# Patient Record
Sex: MALE | Race: WHITE | Employment: OTHER | URBAN - METROPOLITAN AREA
[De-identification: names, ages, dates, MRNs, and addresses within clinical notes are randomized per-mention and may not be internally consistent; named-entity substitution may affect disease eponyms.]

---

## 2022-01-12 ENCOUNTER — HOSPITAL ENCOUNTER (OUTPATIENT)
Dept: PHYSICAL THERAPY | Age: 87
Discharge: HOME | End: 2022-01-12
Payer: MEDICARE

## 2022-01-12 PROCEDURE — 97162 PT EVAL MOD COMPLEX 30 MIN: CPT

## 2022-01-12 PROCEDURE — 97110 THERAPEUTIC EXERCISES: CPT

## 2022-01-12 NOTE — PROGRESS NOTES
PHYSICAL THERAPY - DAILY TREATMENT NOTE     Patient Name: Sandra Washington        Date: 2022  : 1926   YES Patient  Verified  Visit #:     Insurance: Payor: Catrachita Garcia / Plan: [place] MEDICARE PART A & B / Product Type: Medicare /      In time: 8:51 AM Out time: 9:51 AM   Total Treatment Time: 60 min. Medicare/[facility] Time Tracking (below)   Total Timed Codes (min):  15 1:1 Treatment Time:  11 min. TREATMENT AREA =  Other abnormalities of gait and mobility [R26.89]    SUBJECTIVE    Pain Level (on 0 to 10 scale):  0 / 10 (seated)   Medication Changes/New allergies or changes in medical history, any new surgeries or procedures? NO    If yes, update Summary List   Subjective Functional Status/Changes:  []  No changes reported     See POC         OBJECTIVE    15 min Therapeutic Exercise:  [x]  See flow sheet   Rationale:      increase ROM, increase strength, improve coordination and increase proprioception to improve the patients ability to transfer, stand, walk, perform ADLs, household chores, and recreation/fitness activities without falls and with no/less pain. Billed With/As:   [x] TE   [] TA   [] Neuro   [] Self Care Patient Education: [x] Review HEP    [x] Progressed/Changed HEP based on:   [x] positioning   [x] body mechanics   [x] transfers   [] heat/ice application    [x] other:  Patient instructed in and briefly performed beginning HEP for LE strengthening. Patient given handouts with pictures and written directions for HEP; copies placed in patient's chart.        Other Objective/Functional Measures:    See POC     Post Treatment Pain Level (on 0 to 10) scale:   0 / 10 (seated)     ASSESSMENT    Assessment/Changes in Function:     See POC     []  See Progress Note/Recertification   Patient will continue to benefit from skilled PT services to modify and progress therapeutic interventions, address functional mobility deficits, address ROM deficits, address strength deficits, analyze and address soft tissue restrictions, analyze and cue movement patterns, analyze and modify body mechanics/ergonomics, assess and modify postural abnormalities and instruct in home and community integration to attain remaining goals. Progress toward goals / Updated goals:    See POC       PLAN    [x]  Upgrade activities as tolerated YES Continue plan of care   []  Discharge due to :    []  Other:      Therapist: Tracey Giordano, PT    Date: 1/12/2022 Time: 8:52 AM   No future appointments.

## 2022-01-12 NOTE — PROGRESS NOTES
[address] [facility] PHYSICAL THERAPY AT [facility] [address] [place], [place] [facility] [address] - Phone: [phone]  Fax: [phone] / [address]  Patient Name: Maria Esther Robert : 1926   Medical   Diagnosis: Other abnormalities of gait and mobility [R26.89]; Falls Treatment Diagnosis: Same; Left hip and thigh pain; s/p left THR   Onset Date: \"Aug 2021\" Date of Surgery: 2021   Referral Source: Consuelo Wood MD Start of Care [facility]): 2022   Prior Hospitalization: See medical history Provider #: [id number]   Prior Level of Function: Lives with his adult daughter and has an adult son living in the [facility]; retired from working, drives, lawn and yard chores on riding mower for his [employer]; independent with ADLs and most household chores--daugher vacuums and son does his yard chores; home and community ambulation with [facility] after left THR until falling 2021. Comorbidities: Hypothyroidism; Depression; Arthritis; Hearing Impairment (hearing aids); Asbestosis; Right TKR (several years ago and doing well)   Medications: Verified on Patient Summary List   The Plan of Care and following information is based on the information from the initial evaluation.   ===================================================================  Assessment / key information:  Pt is a 81 yo right handed male who presents with signs and symptoms consistent with left hip and thigh pains after two falls and history of left THR in 2021. Pt reports location of pain left lateral hip and lateral thigh, nature of pain is intermittent, current pain level 0/10 (seated), pain level at worst 8/10, and pain level at best 0/10. Aggravating factors:  Standing and walking. Relieving factors:  Sitting and lying down.   PMHx/Falls:  As above, fell when going down a curb while using right SPC in 2021 and fell again in 2021 in his home going down last of 3 steps (taller than other 2)  into his den. Home/Occupation:  Lives with his adult daughter and has adult so nearby; Retired from working; Drives; [employer], but 3 steps up with railings from [employer] to [employer]; Lawn/Yard chores for his [employer] using riding mower. CLoF:  Independent ambulation with 4-wheeled RW--discomfort with first up and walking and it eases some as he continues to walk; independent self-care ADLs and does some household chores, but L hip/thigh pain in weightbearing (stand, walk); OK sleeping; OK driving (automatic transmission), but a little discomfort and difficulty getting into car for driving and uses hand to assist L LE; OK sitting, but difficulty and discomfort with arising from chairs; OK using L LE for pedal operation on riding mower. Today in PT, patient reports that he had home PT and outpatient PT (with HEP) following left THR (03/2021) and was doing very well and had progressed to walking well with a SPC (in right hand), but he fell when going down a curb at his [employer] in 08/2021, needing help from 2 men to get back to standing, and went to ER due to sustaining L orbital laceration and cracking a few lower left ribs; he denies left LE injury in the fall and xrays were negative. Since then, patient returned to walking with RW and had additional home health PT. Since his fall, patient has been experiencing pain at his left lateral hip and thigh area with arising from sitting, standing, and walking, but no pain in non-weightbearing positions. Pt reports some tenderness present at left posterior gluteal area. Pt was his PCP on 01/10/2022 for routine examination/follow-up and discussed his falls and symptoms and he was given Rx for PT. Pt reports that he has resumed some of his PT home exercises and they are OK; he will bring list/pictures in at next visit. Pt takes 2 tablets of OTC pain medication in the mornings and again at night and it does help some.   Pt is not using heat or ice for pain. OBJECTIVE:      Posture/Observation:  Sits and stands with elevated bilateral shoulders; sitting and standing trunk posture is WFL. Functional Mobility:  Sit ==> Stand with need for hands to push up on chair armrests and minimal L hip/thigh discomfort; Stand ==> Sit with reasonable control but keeping hands on RW handles; patient initially leaves RW to one side and walks 2-3 steps without AD use and moves to sitting with use of hands on chair armrests. Patient is able to bend over by flexing at hips > spine and knees to reach one hand to retrieve small item from floor and other hand on RW handle (unlocked) for support. Independent without pain but with some increased effort for sit <==> supine (to left) and rolling supine <==> right sidelying. Not Tested/To Be Assessed = SLS and balance in SLS, Heel Raises (ankle PF in SLS), Heel Walking, and Squating. Gait:  Pt ambulates independently/SBAssist with 4-wheeled RW for ~200 to 250' at good terry and correct usage and no deviations and minimal c/o left lateral hip/thigh discomfort; ambulation with SPC in right hand with CG/SBAssist for ~40' with correct form/sequencing and slightly slower terry, touching furniture with L hand when turning around, no deviations and minimal c/o L LE discomfort. LE PROM and Flexibility (supine):  Hip Flex (supine KTC) = to ~110 deg L and some end range anterior hip discomfort, WFL and okay R; Hip ABd/Hip Adductors (supine) = mildly decreased/tight R and mild to moderately decreased/tight L, both with some muscle stretch discomfort; Hip ER (@90 deg hip flex supine) = ~70 to 75 deg left and OK, NT/TBA left; Hip IR (@90 deg hip flex supine) = ~5 to 10 deg left with thigh discomfort, NT/TBA left;  Hip Extension = Not Tested/TBA, but to neutral and okay bilat in supine; Knee Extension (supine) = full and OK bilat; Knee Flex (@90 deg hip flex supine) = WFL and okay left, NT/TBA left; Hamstrings (knee ext PROM @90 deg hip flex supine) = about -45 deg left and about -30 to -35 deg right, okay bilat; Piriformis (diagonal KTC supine) = WFL and okay right, mildly decreased motion and some hip discomfort left; Gastroc/Ankle DF (supine with knee extended) = WFL bilat, but muscle stretch discomfort L > R; Not Tested/To Be Assessed = Iliopsoas, ITB, Quadriceps. LE MMT (isometric hold in mid range): Hip Flex (reclined sitting) = 5-/5 R and 4 to 4+/5 L, okay bilat; Hip ER (seated) = 5/5 R and 3- to 3/5 L and okay bilat; Hip IR (seated) = 5/5 R and okay, 5-/5 L and some thigh discomfort; Hip ABd (SLR in contralateral sidelying) = </= 2 to 2+/5 right and OK, and Not Tested/TBA left; Hip Adduction (supine) = 5/5 R and 4+ to 5-/5 L and okay bilat; Hip Ext (@90 deg hip flex supine) = 5-/5 R and 4+ to 5-/5 L and okay bilat; Knee Flex (seated) = 5/5 R and 5- to 5/5 L and okay bilat; Knee Ext (seated) = 5-/5 R and 4+ to 5-/5 L and okay bilat; Ankle Motions = Not Tested/To Be Assessed. Palpation:  tender left later thigh and left posterior gluteal areas. Special Tests:  Harkins Balance Scale; Timed Get Up and Go; and Functional Gait Assessment = Not Tested/To Be Assessed. Pt would benefit from skilled PT intervention in order to improve upon previously mentioned subjective/objective impairments. ===========================================================================================  Eval Complexity:  History:  HIGH Complexity :3+ comorbidities / personal factors will impact the outcome/ POC ;  Examination:  MEDIUM Complexity : 3 Standardized tests and measures addressing body structure, function, activity limitation and / or participation in recreation ; Presentation:  MEDIUM Complexity : Evolving with changing characteristics ; Decision Making:  MEDIUM Complexity : FOTO score of 26-74; Overall Complexity:  MEDIUM.   Problem List:  pain affecting function, decrease ROM, decrease strength, impaired gait/ balance, decrease ADL/ functional abilitiies, decrease activity tolerance, decrease flexibility/ joint mobility and decrease transfer abilities. Treatment Plan may include any combination of the following:  Therapeutic exercise, Therapeutic activities, Neuromuscular re-education, Physical agent/modality, Gait/balance training, Manual therapy, Patient education, Self Care training, Functional mobility training, Home safety training and Stair training. Patient / Family readiness to learn indicated by:  asking questions, trying to perform skills and interest.  Persons(s) to be included in education:  Patient (P) and Family Support Person (FSP)--Daughter. Barriers to Learning/Limitations:  yes;  sensory deficits-vision/hearing/speech  Measures taken, if barriers to learning Hearing impairment--wears hearing aids, but left is not functioning and will be repaired--speak from patient's right side and/or face to face with eye contact and from closer distance and/or with increased volume. Patient Goal (s): \"gain stability and balane\"     Patient self reported health status:  Good. Rehabilitation Potential:  Good.  Short Term Goals: To be accomplished in  4  weeks:    1. Establish initial HEP and pt compliant with instructions. 2. Decrease max pain scale rating by >/= 2-3 points. 3. Consistent correct hand placement for sit <==> stand transfers; locking RW brakes when needed. 4. Increase L LE MMT scores by >/= 1/3 grade or to >/= 5-/5 throughout. 5. Increase left HS flexibility to equal to right.  Long Term Goals: To be accomplished in  8  weeks:    1. Increase FOTO score to >/= 62/100 indicating improved function. 2. Decrease max pain scale rating by >/= 5-6 points. 3. No tenderness to palpation. 4. Increase L LE MMT scores by >/= 2/3 grade or to >/= 5- to 5/5 throughout and no/negligible discomfort for resistive testing.   5. Patient safe for home and community ambulation with right SPC, including stairs and curbs and inclines. Frequency / Duration:  Patient to be seen  2-3  times per week for 6-8 weeks. Patient / Caregiver education and instruction:  self care, activity modification, and exercises. Therapist Signature: Joseluis Santillan PT Date: 8/82/8779   Certification Period: 01/12/2022 to 04/12/2022 Time: 8:51 AM   ===========================================================================================    To ensure your patient receives the highest quality care and to avoid disruption in therapy please sign and return this plan of care within 21 days. Per Medicaid guidelines if the plan of care is not received within 21 days the patient's care must be put on hold until signed. I certify that the above Physical Therapy Services are being furnished while the patient is under my care. I agree with the treatment plan and certify that this therapy is necessary. Physician Signature:      Date:       Time:     Kate Means MD      Please sign and return to [facility] at [address] or you may fax the signed copy to [phone]. Thank you.

## 2022-01-14 ENCOUNTER — HOSPITAL ENCOUNTER (OUTPATIENT)
Dept: PHYSICAL THERAPY | Age: 87
Discharge: HOME | End: 2022-01-14
Payer: MEDICARE

## 2022-01-14 PROCEDURE — 97112 NEUROMUSCULAR REEDUCATION: CPT

## 2022-01-14 PROCEDURE — 97110 THERAPEUTIC EXERCISES: CPT

## 2022-01-14 PROCEDURE — 97530 THERAPEUTIC ACTIVITIES: CPT

## 2022-01-14 NOTE — PROGRESS NOTES
PHYSICAL THERAPY - DAILY TREATMENT NOTE     Patient Name: Norma Blanco        Date: 2022  : 1926   YES Patient  Verified  Visit #:      12  Insurance: Payor: VA MEDICARE / Plan: VA MEDICARE PART A & B / Product Type: Medicare /      In time: 8:10 AM Out time: 9:13 AM   Total Treatment Time: 63 min. Medicare/[employer] Time Tracking (below)   Total Timed Codes (min):  63 min. 1:1 Treatment Time:  63 min. TREATMENT AREA =  Other abnormalities of gait and mobility [R26.89]    SUBJECTIVE    Pain Level (on 0 to 10 scale):  0 / 10   Medication Changes/New allergies or changes in medical history, any new surgeries or procedures? NO    If yes, update Summary List   Subjective Functional Status/Changes:  []  No changes reported     No c/o problems with or questions about new HEP handouts from first PT session. Patient brings in post-op THR booklet (with exercises) given to him in the hospital and some HEP handouts given to him by previous PT and reviewed by therapist--standard hip and knee exercises on bed, sitting, and standing, including sit <==> stand from/to chair. OBJECTIVE    43 min Therapeutic Exercise:  [x]  See flow sheet   Rationale:      increase ROM, increase strength, improve coordination, improve balance and increase proprioception to improve the patients ability to transfer, stand, walk, perform ADLs, household chores, and recreation/fitness activities without falls and with no/less pain. 10 min Therapeutic Activity: [x]  See flow sheet   Rationale:      increase ROM, increase strength, improve coordination, improve balance and increase proprioception to improve the patients ability to transfer, stand, walk, perform ADLs, household chores, and recreation/fitness activities without falls and with no/less pain.       10 min Neuromuscular Re-ed: [x]  See flow sheet   Rationale:      increase strength, improve coordination, improve balance and increase proprioception to improve the patients ability to transfer, stand, walk, perform ADLs, household chores, and recreation/fitness activities without falls and with no/less pain. Billed With/As:   [x] TE   [x] TA   [x] Neuro   [x] Self Care Patient Education: [x] Review HEP    [x] Progressed/Changed HEP based on:   [x] positioning   [x] body mechanics   [] transfers   [] heat/ice application    [x] other:  Added to HEP--standing weightshift forward/back with feet in \"step stance\" (L/R and L\R) with min UE support; will give handout at next PT session. Other Objective/Functional Measures:    Pt ambulated about 60' x 2 with right SPC and CG/SBAssist with good technique and stability. Post Treatment Pain Level (on 0 to 10) scale:   0 / 10     ASSESSMENT    Assessment/Changes in Function:     Continued very weak left hip aBductors and external rotators; did a bit better with eccentric L hip aBd SLRs in R sidelying compared to concentric attempts. []  See Progress Note/Recertification   Patient will continue to benefit from skilled PT services to modify and progress therapeutic interventions, address functional mobility deficits, address ROM deficits, address strength deficits, analyze and address soft tissue restrictions, analyze and cue movement patterns, analyze and modify body mechanics/ergonomics, assess and modify postural abnormalities and instruct in home and community integration to attain remaining goals. Progress toward goals / Updated goals:    Good Progress to    [x] STG    [] LTG  1, 2, 4 as shown by patient report and observed performance of activities during the PT session.        PLAN    [x]  Upgrade activities as tolerated YES Continue plan of care   []  Discharge due to :    []  Other:      Therapist: Kailee Parry, PT    Date: 1/14/2022 Time: 7:12 AM     Future Appointments   Date Time Provider Jeremias Joyce   1/14/2022  8:15 AM Keyon Kinsey PT [facility]   1/17/2022  9:00 AM Marcelina Cornel Canavan, PT [facility]   1/19/2022  9:00 AM Mickiel Both, PT [facility]   1/24/2022  9:00 AM Mickiel Both, PT [facility]   1/26/2022  9:00 AM Mickiel Both, PT [facility]   1/31/2022  8:15 AM [facility]   2/2/2022  8:15 AM Thedford Kingfisher, PTA [facility]   2/7/2022  9:00 AM [facility]   2/9/2022  9:00 AM [facility]   2/14/2022  9:00 AM Mickiel Both, PT [facility]   2/16/2022  9:00 AM Mickiel Both, PT [facility]   2/19/2022  8:15 AM Mickiel Both, PT [facility]

## 2022-01-17 ENCOUNTER — HOSPITAL ENCOUNTER (OUTPATIENT)
Dept: PHYSICAL THERAPY | Age: 87
Discharge: HOME | End: 2022-01-17
Payer: MEDICARE

## 2022-01-17 PROCEDURE — 97530 THERAPEUTIC ACTIVITIES: CPT

## 2022-01-17 PROCEDURE — 97112 NEUROMUSCULAR REEDUCATION: CPT

## 2022-01-17 PROCEDURE — 97110 THERAPEUTIC EXERCISES: CPT

## 2022-01-17 NOTE — PROGRESS NOTES
PHYSICAL THERAPY - DAILY TREATMENT NOTE      Patient Name: Autumn Dubois                           Date: 2022  : 1926                                  YES Patient  Verified  Visit #:   3   of   12                  Insurance: Payor: Jarrett Memory / Plan: VA MEDICARE PART A & B / Product Type: Medicare /       In time: 9:00 AM Out time: 10:00 AM   Total Treatment Time: 60 min.          Medicare/[facility] Time Tracking (below)   Total Timed Codes (min):  60 min. 1:1 Treatment Time:  54 min.      TREATMENT AREA =  Other abnormalities of gait and mobility [R26.89]     SUBJECTIVE     Pain Level (on 0 to 10 scale):  0 / 10   Medication Changes/New allergies or changes in medical history, any new surgeries or procedures? NO    If yes, update Summary List   Subjective Functional Status/Changes:  []? No changes reported      No c/o pain currently and he felt OK after last PT session. No problems over this past weekend.         OBJECTIVE     40 min Therapeutic Exercise:  [x]? See flow sheet   Rationale:      increase ROM, increase strength, improve coordination, improve balance and increase proprioception to improve the patients ability to transfer, stand, walk, perform ADLs, household chores, and recreation/fitness activities without falls and with no/less pain.       10 min Therapeutic Activity: [x]? See flow sheet   Rationale:      increase ROM, increase strength, improve coordination, improve balance and increase proprioception to improve the patients ability to transfer, stand, walk, perform ADLs, household chores, and recreation/fitness activities without falls and with no/less pain.       10 min Neuromuscular Re-ed: [x]?   See flow sheet   Rationale:      increase strength, improve coordination, improve balance and increase proprioception to improve the patients ability to transfer, stand, walk, perform ADLs, household chores, and recreation/fitness activities without falls and with no/less pain.     Billed With/As:   [x]? TE   [x]? TA   [x]? Neuro   [x]? Self Care Patient Education: [x]? Review HEP    [x]? Progressed/Changed HEP based on:   []? positioning   []? body mechanics   []? transfers   []? heat/ice application    []? other:               Other Objective/Functional Measures:     Pt ambulated about 61' and 90' x 1 each with right SPC and CG/SBAssist with good technique and stability--a little unsteady with first step or two after having been supine doing LE strengthening exercises for ~40 minutes prior.         Post Treatment Pain Level (on 0 to 10) scale:   0 / 10      ASSESSMENT     Assessment/Changes in Function:      Continued very weak left hip aBductors and external rotators; did a bit better with eccentric L hip aBd SLRs in R sidelying compared to concentric attempts and with later reps of manuallly resisted eccentric aBd heel slides.      []? See Progress Note/Recertification   Patient will continue to benefit from skilled PT services to modify and progress therapeutic interventions, address functional mobility deficits, address ROM deficits, address strength deficits, analyze and address soft tissue restrictions, analyze and cue movement patterns, analyze and modify body mechanics/ergonomics, assess and modify postural abnormalities and instruct in home and community integration to attain remaining goals. Progress toward goals / Updated goals:     Good Progress to    [x]? STG    []? LTG  1, 2, 4 as shown by patient report and observed performance of activities during the PT session.         PLAN           [x]? Upgrade activities as tolerated YES Continue plan of care   []? Discharge due to :     []?   Other:              Therapist: SANDY Hart     Date: 01/17/2022 Time: 7:28 AM

## 2022-01-19 ENCOUNTER — HOSPITAL ENCOUNTER (OUTPATIENT)
Dept: PHYSICAL THERAPY | Age: 87
Discharge: HOME | End: 2022-01-19
Payer: MEDICARE

## 2022-01-19 PROCEDURE — 97530 THERAPEUTIC ACTIVITIES: CPT

## 2022-01-19 PROCEDURE — 97112 NEUROMUSCULAR REEDUCATION: CPT

## 2022-01-19 PROCEDURE — 97110 THERAPEUTIC EXERCISES: CPT

## 2022-01-19 NOTE — PROGRESS NOTES
PHYSICAL THERAPY - DAILY TREATMENT NOTE      Patient Name: Herberth Nj                           Date: 2022  : 1926                                  YES Patient  Verified  Visit #:                     Insurance: Payor: Luis Eduardo Ricci / Plan: [place] MEDICARE PART A & B / Product Type: Medicare /       In time: 9:00 AM Out time: 10:01 AM   Total Treatment Time: 60 min.            Medicare/[facility] Time Tracking (below)   Total Timed Codes (min):  60 min. 1:1 Treatment Time:  60 min.      TREATMENT AREA =  Other abnormalities of gait and mobility [R26.89]     SUBJECTIVE     Pain Level (on 0 to 10 scale):  0 / 10   Medication Changes/New allergies or changes in medical history, any new surgeries or procedures?    NO    If yes, update Summary List   Subjective Functional Status/Changes:  []??  No changes reported      No c/o pain currently, but felt \"stiff\" after his last PT session and the next day--he applied ice.       OBJECTIVE     31 min Therapeutic Exercise:  [x]? ?  See flow sheet   Rationale:      increase ROM, increase strength, improve coordination, improve balance and increase proprioception to improve the patients ability to transfer, stand, walk, perform ADLs, household chores, and recreation/fitness activities without falls and with no/less pain.       15 min Therapeutic Activity: [x]? ?  See flow sheet   Rationale:      increase ROM, increase strength, improve coordination, improve balance and increase proprioception to improve the patients ability to transfer, stand, walk, perform ADLs, household chores, and recreation/fitness activities without falls and with no/less pain.       15 min Neuromuscular Re-ed: [x]? ?  See flow sheet   Rationale:      increase strength, improve coordination, improve balance and increase proprioception to improve the patients ability to transfer, stand, walk, perform ADLs, household chores, and recreation/fitness activities without falls and with no/less pain.       Billed With/As:   []?? TE   []?? TA   [x]? ? Neuro   []? ? Self Care Patient Education: [x]? ? Review HEP    []? ? Progressed/Changed HEP based on:   []?? positioning   []? ? body mechanics   []? ? transfers   []? ? heat/ice application    []? ? other:                 Other Objective/Functional Measures:     Pt tolerated more standing activities well today.         Post Treatment Pain Level (on 0 to 10) scale:   0 / 10      ASSESSMENT     Assessment/Changes in Function:      Pt doing better with eccentric L hip aBd SLRs in R sidelying and seated T-band resisted hip aBd/ER.      []? ?  See Progress Note/Recertification   Patient will continue to benefit from skilled PT services to modify and progress therapeutic interventions, address functional mobility deficits, address ROM deficits, address strength deficits, analyze and address soft tissue restrictions, analyze and cue movement patterns, analyze and modify body mechanics/ergonomics, assess and modify postural abnormalities and instruct in home and community integration to attain remaining goals.      Progress toward goals / Updated goals:     Good Progress to    [x]? ? STG    []? ? LTG  1, 2, 4 as shown by patient report and observed performance of activities during the PT session.         PLAN               [x]? ?  Upgrade activities as tolerated YES Continue plan of care   []? ?  Discharge due to :     []? ?  Other:                  Therapist: SANDY Barnard     Date: 01/19/2022 Time: 7:12 AM

## 2022-01-24 ENCOUNTER — HOSPITAL ENCOUNTER (OUTPATIENT)
Dept: PHYSICAL THERAPY | Age: 87
Discharge: HOME | End: 2022-01-24
Payer: MEDICARE

## 2022-01-24 PROCEDURE — 97110 THERAPEUTIC EXERCISES: CPT

## 2022-01-24 PROCEDURE — 97112 NEUROMUSCULAR REEDUCATION: CPT

## 2022-01-24 PROCEDURE — 97530 THERAPEUTIC ACTIVITIES: CPT

## 2022-01-24 NOTE — PROGRESS NOTES
PHYSICAL THERAPY - DAILY TREATMENT NOTE      Patient Name: Herberth Nj                           Date: 2022  : 1926                                  YES Patient  Verified  Visit #:                     Insurance: Payor: Nisa Ireland / Plan: [place] MEDICARE PART A & B / Product Type: Medicare /       In time: 8:49 AM Out time: 9:50 AM   Total Treatment Time: 61 min.            Medicare/[facility] Time Tracking (below)   Total Timed Codes (min):  61 min. 1:1 Treatment Time:  56 min.      TREATMENT AREA =  Other abnormalities of gait and mobility [R26.89]     SUBJECTIVE     Pain Level (on 0 to 10 scale):  0 / 10   Medication Changes/New allergies or changes in medical history, any new surgeries or procedures?    NO    If yes, update Summary List   Subjective Functional Status/Changes:  []???  No changes reported      No c/o pain currently, but felt \"stiff\" after his last PT session and the next day--he applied ice.       OBJECTIVE     31 min Therapeutic Exercise:  [x]? ??  See flow sheet   Rationale:      increase ROM, increase strength, improve coordination, improve balance and increase proprioception to improve the patients ability to transfer, stand, walk, perform ADLs, household chores, and recreation/fitness activities without falls and with no/less pain.       15 min Therapeutic Activity: [x]???  See flow sheet   Rationale:      increase ROM, increase strength, improve coordination, improve balance and increase proprioception to improve the patients ability to transfer, stand, walk, perform ADLs, household chores, and recreation/fitness activities without falls and with no/less pain.       15 min Neuromuscular Re-ed: [x]???  See flow sheet   Rationale:      increase strength, improve coordination, improve balance and increase proprioception to improve the patients ability to transfer, stand, walk, perform ADLs, household chores, and recreation/fitness activities without falls and with no/less pain.       Billed With/As:   []??? TE   []??? TA   []??? Neuro   []??? Self Care Patient Education: [x]??? Review HEP    []? ?? Progressed/Changed HEP based on:   []??? positioning   []? ?? body mechanics   []? ?? transfers   []? ?? heat/ice application    []? ?? other:                 Other Objective/Functional Measures:     Pt did very well with standing weightshifts and with walking without UE support in parallel bars (mirror for visual feedback) and focusing on step symmetry and avoiding scissoring.       Post Treatment Pain Level (on 0 to 10) scale:   0 / 10      ASSESSMENT     Assessment/Changes in Function:      Pt doing better with manually resisted eccentric L hip aBd slides in supine and with assisted conc/independent ecc R hip aBd SLRs in L sidelying.      []? ??  See Progress Note/Recertification   Patient will continue to benefit from skilled PT services to modify and progress therapeutic interventions, address functional mobility deficits, address ROM deficits, address strength deficits, analyze and address soft tissue restrictions, analyze and cue movement patterns, analyze and modify body mechanics/ergonomics, assess and modify postural abnormalities and instruct in home and community integration to attain remaining goals.      Progress toward goals / Updated goals:     Good Progress to    [x]? ?? STG    []? ?? LTG  1, 2, 4 as shown by patient report and observed performance of activities during the PT session.         PLAN               [x]? ??  Upgrade activities as tolerated YES Continue plan of care   []? ??  Discharge due to :     []???  Other:                  Therapist: Slick Cowan     Date: 01/24/2022 Time: 7:15 AM

## 2022-01-26 ENCOUNTER — HOSPITAL ENCOUNTER (OUTPATIENT)
Dept: PHYSICAL THERAPY | Age: 87
Discharge: HOME | End: 2022-01-26
Payer: MEDICARE

## 2022-01-26 PROCEDURE — 97110 THERAPEUTIC EXERCISES: CPT

## 2022-01-26 PROCEDURE — 97530 THERAPEUTIC ACTIVITIES: CPT

## 2022-01-26 PROCEDURE — 97112 NEUROMUSCULAR REEDUCATION: CPT

## 2022-01-26 NOTE — PROGRESS NOTES
PHYSICAL THERAPY - DAILY TREATMENT NOTE      Patient Name: Herberth Nj                           Date:   : 1926                                  YES Patient  Verified  Visit #:                     Insurance: Payor: Naldo Khalil / Plan: [place] MEDICARE PART A & B / Product Type: Medicare /       In time: 8:53 AM Out time: 9:52 AM   Total Treatment Time: 59 min.            Medicare/[facility] Time Tracking (below)   Total Timed Codes (min):  59 min. 1:1 Treatment Time:  58 min.      TREATMENT AREA =  Other abnormalities of gait and mobility [R26.89]     SUBJECTIVE     Pain Level (on 0 to 10 scale):  0 / 10   Medication Changes/New allergies or changes in medical history, any new surgeries or procedures?    NO    If yes, update Summary List   Subjective Functional Status/Changes:  []????  No changes reported      No c/o pain currently, but felt \"stiff\" after his last PT session and the next day--he applied ice.       OBJECTIVE     25 min Therapeutic Exercise:  [x]? ???  See flow sheet   Rationale:      increase ROM, increase strength, improve coordination, improve balance and increase proprioception to improve the patients ability to transfer, stand, walk, perform ADLs, household chores, and recreation/fitness activities without falls and with no/less pain.       15 min Therapeutic Activity: [x]????  See flow sheet   Rationale:      increase ROM, increase strength, improve coordination, improve balance and increase proprioception to improve the patients ability to transfer, stand, walk, perform ADLs, household chores, and recreation/fitness activities without falls and with no/less pain.       19 min Neuromuscular Re-ed: [x]????  See flow sheet   Rationale:      increase strength, improve coordination, improve balance and increase proprioception to improve the patients ability to transfer, stand, walk, perform ADLs, household chores, and recreation/fitness activities without falls and with no/less pain.       Billed With/As:   []???? TE   []???? TA   []???? Neuro   []???? Self Care Patient Education: [x]???? Review HEP    []???? Progressed/Changed HEP based on:   []???? positioning   []???? body mechanics   []???? transfers   []???? heat/ice application    []???? other:                 Other Objective/Functional Measures:     Pt did very well with walking in parallel bars and 360 degrees turns and sidestepping.    Post Treatment Pain Level (on 0 to 10) scale:   0 / 10      ASSESSMENT     Assessment/Changes in Function:      Right hip aBd and ER, more so than IR, remai weak, but improving.      []????  See Progress Note/Recertification   Patient will continue to benefit from skilled PT services to modify and progress therapeutic interventions, address functional mobility deficits, address ROM deficits, address strength deficits, analyze and address soft tissue restrictions, analyze and cue movement patterns, analyze and modify body mechanics/ergonomics, assess and modify postural abnormalities and instruct in home and community integration to attain remaining goals.      Progress toward goals / Updated goals:     Good Progress to    [x]? ??? STG    []???? LTG  1, 2, 4 as shown by patient report and observed performance of activities during the PT session.         PLAN               [x]? ???  Upgrade activities as tolerated YES Continue plan of care   []????  Discharge due to :     []????  Other:                  Therapist: Jorge Alberto Goel     Date: 01/26/2022 Time: 7:17 AM

## 2022-01-31 ENCOUNTER — HOSPITAL ENCOUNTER (OUTPATIENT)
Dept: PHYSICAL THERAPY | Age: 87
Discharge: HOME | End: 2022-01-31
Payer: MEDICARE

## 2022-01-31 PROCEDURE — 97110 THERAPEUTIC EXERCISES: CPT

## 2022-01-31 PROCEDURE — 97112 NEUROMUSCULAR REEDUCATION: CPT

## 2022-01-31 PROCEDURE — 97530 THERAPEUTIC ACTIVITIES: CPT

## 2022-01-31 NOTE — PROGRESS NOTES
PHYSICAL THERAPY - DAILY TREATMENT NOTE     Patient Name: Noelle Guillory        Date: 2022  : 1926   YES Patient  Verified  Visit #:     Insurance: Payor: Inna Heath / Plan: [place] MEDICARE PART A & B / Product Type: Medicare /      In time: 8:16 Out time: 8:57   Total Treatment Time: 41     Medicare/[employer] Time Tracking (below)   Total Timed Codes (min): 41 1:1 Treatment Time:  39     TREATMENT AREA =  Other abnormalities of gait and mobility [R26.89]    SUBJECTIVE    Pain Level (on 0 to 10 scale): 0  / 10   Medication Changes/New allergies or changes in medical history, any new surgeries or procedures?     NO    If yes, update Summary List   Subjective Functional Status/Changes:  []  No changes reported       Functional improvements: pt reports his ROM is improving  Functional impairments: imbalance in ADLs         OBJECTIVE  Modalities Rationale: n/a      20 min Therapeutic Exercise:  [x]  See flow sheet   Rationale:   increase ROM, increase strength, improve coordination, improve balance and increase proprioception to improve the patients ability to transfer, stand, walk, perform ADLs, household chores, and recreation/fitness activities without falls and with no/less pain    9 min Therapeutic Activity: [x]  See flow sheet   Rationale:     increase ROM, increase strength, improve coordination, improve balance and increase proprioception to improve the patients ability to transfer, stand, walk, perform ADLs, household chores, and recreation/fitness activities without falls and with no/less pain    12 min Neuromuscular Re-ed: [x]  See flow sheet   Rationale:   increase ROM, increase strength, improve coordination, improve balance and increase proprioception to improve the patients ability to transfer, stand, walk, perform ADLs, household chores, and recreation/fitness activities without falls and with no/less pain        Billed With/As:   [] TE   [] TA   [] Neuro   [] Self Care Patient Education: [x] Review HEP    [] Progressed/Changed HEP based on:   [] positioning   [] body mechanics   [] transfers   [] heat/ice application    [] other:        Other Objective/Functional Measures: Add standing march and hip abd  Pulse: 66 after standing exercise   Post Treatment Pain Level (on 0 to 10) scale:   0 / 10     ASSESSMENT    Assessment/Changes in Function:   Pt reporting mild dizziness with sit to stand and after standing exercises resolving < 1 min with sitting rest.   Pt requires close S in all standing balance activities. Mild use of UE to assist in sit to stand from standard height chair   []  See Progress Note/Recertification   Patient will continue to benefit from skilled PT services to modify and progress therapeutic interventions, address functional mobility deficits, address ROM deficits, address strength deficits, analyze and address soft tissue restrictions, analyze and cue movement patterns, address imbalance/dizziness and instruct in home and community integration to attain remaining goals.       Progress toward goals / Updated goals:    Good Progress to    [x] STG    [] LTG  1 as shown by pt self report; emphasized consistency in HEP as tolerated       PLAN    [x]  Upgrade activities as tolerated YES Continue plan of care   []  Discharge due to :    []  Other:      Therapist: Javier Tompkins PTA    Date: 1/31/2022 Time: 8:57 AM     Future Appointments   Date Time Provider Jeremias Joyce   2/2/2022 10:30 AM Jennifer Hess PTA [facility]   2/7/2022  9:00 AM Jennifer Hess PTA [facility]   2/9/2022  9:00 AM Jennifer Hess PTA [facility]   2/14/2022  9:00 AM Genevieve New, PT [facility]   2/16/2022  9:00 AM Genevieve New, PT [facility]

## 2022-02-02 ENCOUNTER — HOSPITAL ENCOUNTER (OUTPATIENT)
Dept: PHYSICAL THERAPY | Age: 87
Discharge: HOME | End: 2022-02-02
Payer: MEDICARE

## 2022-02-02 PROCEDURE — 97112 NEUROMUSCULAR REEDUCATION: CPT

## 2022-02-02 PROCEDURE — 97110 THERAPEUTIC EXERCISES: CPT

## 2022-02-02 NOTE — PROGRESS NOTES
PHYSICAL THERAPY - DAILY TREATMENT NOTE     Patient Name: Chante Lira        Date: 2022  : 1926   YES Patient  Verified  Visit #:     Insurance: Payor: Ady Finley / Plan: [place] MEDICARE PART A & B / Product Type: Medicare /      In time: 10:23 am Out time: 11:04 am   Total Treatment Time: 51     Medicare/[employer] Time Tracking (below)   Total Timed Codes (min):  51 1:1 Treatment Time:  40     TREATMENT AREA =  Other abnormalities of gait and mobility [R26.89]    SUBJECTIVE    Pain Level (on 0 to 10 scale):  0  / 10   Medication Changes/New allergies or changes in medical history, any new surgeries or procedures? NO    If yes, update Summary List   Subjective Functional Status/Changes:  []  No changes reported     Pt reports he feels well . Still having trouble with his balance         OBJECTIVE  Modalities Rationale:   N/a  42 min Therapeutic Exercise:  [x]  See flow sheet   Rationale:      increase ROM and increase strength to improve the patients ability to perform functional ADLs without pain   9 min Neuromuscular Re-ed: [x]  See flow sheet   Rationale:      increase ROM, improve balance and increase proprioception to improve the patients ability to perform functional ADLs without pain    Billed With/As:   [] TE   [] TA   [] Neuro   [] Self Care Patient Education: [x] Review HEP    [] Progressed/Changed HEP based on:   [] positioning   [] body mechanics   [] transfers   [] heat/ice application    [] other:        Other Objective/Functional Measures: Add static balance EO/EC      Post Treatment Pain Level (on 0 to 10) scale:   0  / 10     ASSESSMENT    Assessment/Changes in Function:     Pt requiring close S secondary to slight decreased use of hip and ankle balance strategy in EC static balance. Advanced LE strengthening , VCs for proper form in sit to stand body mechanics.  Pt using bilateral UE for assist with sit to stand     []  See Progress Note/Recertification   Patient will continue to benefit from skilled PT services to modify and progress therapeutic interventions, address functional mobility deficits, address ROM deficits, address strength deficits, analyze and address soft tissue restrictions, address imbalance/dizziness and instruct in home and community integration to attain remaining goals.       Progress toward goals / Updated goals:    Good Progress to    [] STG    [x] LTG  2 as shown by advanced LE strengthening and static balance       PLAN    [x]  Upgrade activities as tolerated YES Continue plan of care   []  Discharge due to :    []  Other:      Therapist: Ibrahima Santos PTA    Date: 2/2/2022 Time: 11:04 AM     Future Appointments   Date Time Provider Jeremias Joyce   2/7/2022  9:00 AM Ericka Morris PTA [facility]   2/9/2022  9:00 AM Ericka Morris PTA [facility]   2/14/2022  9:00 AM Ruperto Yates PT [facility]   2/16/2022  9:00 AM Ruperto Yates PT [facility]

## 2022-02-07 ENCOUNTER — HOSPITAL ENCOUNTER (OUTPATIENT)
Dept: PHYSICAL THERAPY | Age: 87
Discharge: HOME | End: 2022-02-07
Payer: MEDICARE

## 2022-02-07 PROCEDURE — 97112 NEUROMUSCULAR REEDUCATION: CPT

## 2022-02-07 PROCEDURE — 97110 THERAPEUTIC EXERCISES: CPT

## 2022-02-07 NOTE — PROGRESS NOTES
PHYSICAL THERAPY - DAILY TREATMENT NOTE     Patient Name: Leighton Winchester        Date: 2022  : 1926   YES Patient  Verified  Visit #:     Insurance: Payor: Adrian Davenport / Plan: [place] MEDICARE PART A & B / Product Type: Medicare /      In time: 9:06 am Out time: 9:48   Total Treatment Time: 42     Medicare/[employer] Time Tracking (below)   Total Timed Codes (min):  42 1:1 Treatment Time: 42     TREATMENT AREA =  Other abnormalities of gait and mobility [R26.89]    SUBJECTIVE    Pain Level (on 0 to 10 scale): 0  / 10   Medication Changes/New allergies or changes in medical history, any new surgeries or procedures? NO    If yes, update Summary List   Subjective Functional Status/Changes:  []  No changes reported       Pt reports he is feeling stronger. No HEP, No falls         OBJECTIVE  Modalities Rationale:   N/a    28 min Therapeutic Exercise:  [x]  See flow sheet   Rationale:      increase ROM, increase strength, improve coordination and increase proprioception to improve the patients ability to perform functional ADLs with decreasing fall risk         14 min Neuromuscular Re-ed: [x]  See flow sheet   Rationale:      increase ROM and increase strength to improve the patients ability to perform functional ADLs without pain         Billed With/As:   [] TE   [] TA   [] Neuro   [] Self Care Patient Education: [x] Review HEP    [] Progressed/Changed HEP based on:   [] positioning   [] body mechanics   [] transfers   [] heat/ice application    [] other:        Other Objective/Functional Measures:    *add S/L hip ABD AAROM, LAQ 2#, increase reps supine bridge     Post Treatment Pain Level (on 0 to 10) scale:   0  / 10     ASSESSMENT    Assessment/Changes in Function:   AAROM with side lying hip abduction on left for support and proper form.  Pt challenged in ability to perform hip ABD in side lying without slight knee flexion     []  See Progress Note/Recertification   Patient will continue to benefit from skilled PT services to modify and progress therapeutic interventions, address functional mobility deficits, address ROM deficits, address strength deficits, analyze and address soft tissue restrictions, analyze and cue movement patterns, address imbalance/dizziness and instruct in home and community integration to attain remaining goals.       Progress toward goals / Updated goals:    Good Progress to    [] STG    [x] LTG  2 as shown by improving use of hip and ankle balance strategy       PLAN    [x]  Upgrade activities as tolerated YES Continue plan of care   []  Discharge due to :    []  Other:      Therapist: Diane Grant PTA    Date: 2/7/2022 Time: 9:48 AM     Future Appointments   Date Time Provider Jeremias Joyce   2/9/2022  9:00 AM Love Bell PTA [facility]   2/14/2022  9:00 AM Aaron Rich PT [facility]   2/17/2022  9:15 AM Love Bell PTA [facility]

## 2022-02-09 ENCOUNTER — HOSPITAL ENCOUNTER (OUTPATIENT)
Dept: PHYSICAL THERAPY | Age: 87
Discharge: HOME | End: 2022-02-09
Payer: MEDICARE

## 2022-02-09 PROCEDURE — 97112 NEUROMUSCULAR REEDUCATION: CPT

## 2022-02-09 PROCEDURE — 97110 THERAPEUTIC EXERCISES: CPT

## 2022-02-09 NOTE — PROGRESS NOTES
PHYSICAL THERAPY - DAILY TREATMENT NOTE     Patient Name: Laurent Lopez        Date: 2022  : 1926   YES Patient  Verified  Visit #:   10   of   12  Insurance: Payor: Gunner Rodrigues / Plan: [place] MEDICARE PART A & B / Product Type: Medicare /      In time: 8:51 am Out time: 9:41   Total Treatment Time: 60     Medicare/[employer] Time Tracking (below)   Total Timed Codes (min):  60 1:1 Treatment Time:  25     TREATMENT AREA =  Other abnormalities of gait and mobility [R26.89]    SUBJECTIVE    Pain Level (on 0 to 10 scale):  0  / 10   Medication Changes/New allergies or changes in medical history, any new surgeries or procedures?     NO    If yes, update Summary List   Subjective Functional Status/Changes:  []  No changes reported       Pt reports intermittent use of HEP, trying to walk more often in the house         OBJECTIVE  Modalities Rationale:   N/a    40 min Therapeutic Exercise:  [x]  See flow sheet   Rationale:      increase ROM, increase strength and improve coordination to improve the patients ability to perform functional ADLs , prolonged standing, decrease fall risk       20 min Neuromuscular Re-ed: [x]  See flow sheet   Rationale:      increase ROM, increase strength, improve balance and increase proprioception to improve the patients ability to perform functional ADLs , prolonged standing, decrease fall risk            Billed With/As:   [] TE   [] TA   [] Neuro   [] Self Care Patient Education: [x] Review HEP    [] Progressed/Changed HEP based on:   [] positioning   [] body mechanics   [] transfers   [] heat/ice application    [] other:        Other Objective/Functional Measures:  See progress note     Post Treatment Pain Level (on 0 to 10) scale:   0  / 10     ASSESSMENT    Assessment/Changes in Function:     See progress note     [x]  See Progress Note/Recertification   Patient will continue to benefit from skilled PT services to modify and progress therapeutic interventions, address functional mobility deficits, address ROM deficits, address strength deficits, analyze and address soft tissue restrictions, address imbalance/dizziness and instruct in home and community integration to attain remaining goals.       Progress toward goals / Updated goals:    See progress note       PLAN    [x]  Upgrade activities as tolerated YES Continue plan of care   []  Discharge due to :    []  Other:      Therapist: Marilyn Rodriguez PTA    Date: 2/9/2022 Time: 9:41 AM     Future Appointments   Date Time Provider Jeremias Joyce   2/14/2022  9:00 AM Tatyana Aaron PT [facility]   2/17/2022  9:15 AM Marcos Petit PTA [facility]

## 2022-02-09 NOTE — PROGRESS NOTES
201 [facility] PHYSICAL THERAPY AT [facility] [address] [place], [address]  Phone: [phone]  Fax: [phone] OF CARE/RECERTIFICATION FOR PHYSICAL THERAPY          Patient Name: Jennifer Narvaez : 1926   Treatment/Medical Diagnosis: Other abnormalities of gait and mobility [R26.89]   Onset Date: \"2021\"    Referral Source: Ashlee Live MD Start of Care [facility]): 2022   Prior Hospitalization: See Medical History Provider #: [id number]   Prior Level of Function: Lives with his adult daughter and has an adult son living in the [facility]; retired from working, drives, lawn and yard chores on riding mower for his [employer]; independent with ADLs and most household chores--[employer] vacuums and son does his yard chores; home and community ambulation with [facility] after left [facility] until falling 2021. Comorbidities: Hypothyroidism; Depression; Arthritis; Hearing Impairment (hearing aids); Asbestosis; Right TKR (several years ago and doing well)   Medications: Verified on Patient Summary List   Visits from [facility]: 10 Missed Visits: 0     Goal/Measure of Progress Goal Met? 1.   Establish initial HEP and pt compliant with instructions. Status at last Eval: dependent Current Status: Pt instructed in initial HEP. Pt reports inconsistent  Performance of HEP Partially met   2. Decrease max pain scale rating by >/= 2-3 points. Status at last Eval: 0 to 8/10 Current Status: Pain: 0 to 5/10  yes   3.  3. Consistent correct hand placement for sit <==> stand transfers; locking RW brakes when needed. 4. Increase L LE MMT scores by >/= 1/3 grade or to >/= 5-/5 throughout. 5. Increase left HS flexibility to equal to right.    Status at last Eval: Sit ==> Stand with need for hands to push up on chair armrests and minimal L hip/thigh discomfort; Stand ==> Sit with reasonable control but keeping hands on RW handles   MMT: Knee Flex (seated) = 5/5 R and 5- to 5/5 L and okay bilat; Knee Ext (seated) = 5-/5 R   and 4+ to 5-/5 L and okay bilat; Ankle Motions = Not Tested/To Be Assessed. Hip Flex (reclined sitting) = 5-/5 R and 4 to 4+/5 L, okay bilat; Hip ER (seated) = 5/5 R and 3- to 3/5 L and okay bilat; Hip IR (seated) = 5/5 R and okay, 5-/5 L and some thigh discomfort; Hip ABd (SLR in contralateral sidelying) = </= 2 to 2+/5 right and OK, and Not Tested/TBA left; Hip Adduction (supine) = 5/5 R and 4+ to 5-/5 L and okay bilat; Hip Ext (@90 deg hip flex supine) = 5-/5 R and 4+ to 5-/5 L and okay bilat; Knee Flex   Hamstring flexibility:   Hamstrings (knee ext PROM @90 deg hip flex supine) = about -45 deg left and about -30 to -35 deg right Current Status: Sit to stand with minimal use of UE for support  Left LE MMT:   Left knee ext (sit) 5/5; right: 5/5;  Knee flexion: left:  5/5; right: 5-/5; Hip flexion: (sit)     Left hip ABD in side lyin/5;  Left hip ER:   Left hip IR:     Hamstring flexibility:   Right: -14 degrees  Left:  - 15 degrees yes     Key Functional Changes/Progress: Functional improvements: LE ROM, decreasing pain with walking, sit to stand with decreasing use of UE support  Problem List: pain affecting function, decrease ROM, decrease strength, impaired gait/ balance, decrease ADL/ functional abilitiies, decrease activity tolerance, decrease flexibility/ joint mobility and decrease transfer abilities   Treatment Plan may include any combination of the following: Therapeutic exercise, Therapeutic activities, Neuromuscular re-education, Physical agent/modality, Gait/balance training, Manual therapy, Patient education, Self Care training, Functional mobility training, Home safety training and Stair training  Patient Goal(s) has been updated and includes:  \"gain stability and balance\"    Goals for this certification period include and are to be achieved in   4 weeks:  1. Increase FOTO score to >/= 62/100 indicating improved function.   2. Decrease max pain scale rating by >/= 5-6 points. 3. No tenderness to palpation. 4. Increase L LE MMT scores by >/= 2/3 grade or to >/= 5- to 5/5 throughout and no/negligible discomfort for resistive testing. 5. Patient safe for home and community ambulation with right SPC, including stairs and curbs and inclines. Frequency / Duration:   Patient to be seen   2-3   times per week for   4  weeks:    Assessments/Recommendations: Patient would benefit from continued skilled physical therapy services to improve  static and dynamic balance, improve strength, ROM, and flexibility, decrease fall risk , and to address above  remaining impairments. If you have any questions/comments please contact us directly at [phone]. Thank you for allowing us to assist in the care of your patient. Therapist Signature: Emelyn Locke PTA Date: 7/8/3469   Certification Period:  Reporting Period: 2-9-2022 to 5-9-2022 1- to 2-9-2022 Time: 9:01 AM   NOTE TO PHYSICIAN:  PLEASE COMPLETE THE ORDERS BELOW AND FAX TO   [facility] at [address]: [phone]. If you are unable to process this request in 24 hours please contact our office: [phone].    ___ I have read the above report and request that my patient continue as recommended.   ___ I have read the above report and request that my patient continue therapy with the following changes/special instructions: ________________________________________________   ___ I have read the above report and request that my patient be discharged from therapy.      Physician Signature:        Date:       Time:    Bucky Burgos MD.

## 2022-02-14 ENCOUNTER — HOSPITAL ENCOUNTER (OUTPATIENT)
Dept: PHYSICAL THERAPY | Age: 87
Discharge: HOME | End: 2022-02-14
Payer: MEDICARE

## 2022-02-14 PROCEDURE — 97530 THERAPEUTIC ACTIVITIES: CPT

## 2022-02-14 PROCEDURE — 97116 GAIT TRAINING THERAPY: CPT

## 2022-02-14 PROCEDURE — 97110 THERAPEUTIC EXERCISES: CPT

## 2022-02-14 NOTE — PROGRESS NOTES
PHYSICAL THERAPY - DAILY TREATMENT NOTE     Patient Name: Noelle Guillory        Date: 2022  : 1926   YES Patient  Verified  Visit #:     Insurance: Payor: Inna Heath / Plan: [place] MEDICARE PART A & B / Product Type: Medicare /      In time: 9:00 AM Out time: 10:00 AM   Total Treatment Time: 60 min. Medicare/BCBS Time Tracking (below)   Total Timed Codes (min):  60 1:1 Treatment Time:  54 min. TREATMENT AREA =  Other abnormalities of gait and mobility [R26.89]    SUBJECTIVE    Pain Level (on 0 to 10 scale):  2 / 10   Medication Changes/New allergies or changes in medical history, any new surgeries or procedures? NO    If yes, update Summary List   Subjective Functional Status/Changes:  []  No changes reported     Pt reports that he fell after walking down a few steps (2-3) this past weekend. Pt went down the steps backwards, then, while facing up the steps, he attempted bringing his walker down from the higher level and he fell forward, landing on his R proximal anterior leg (against step edge?), sustaining contusion/abrasion to the area. Right proximal anterior leg is source of current discomfort; no c/o LB or hip or thigh pains.        OBJECTIVE    30 min Therapeutic Exercise:  [x]  See flow sheet   Rationale:      increase ROM, increase strength and improve coordination to improve the patients ability to transfer, stand, walk, perform ADLs, household chores, and recreation/fitness activities without falls and with no/less pain.      17 min Therapeutic Activity: [x]  See flow sheet   Rationale:      increase ROM, increase strength, improve coordination, improve balance and increase proprioception to improve the patients ability to transfer, stand, walk, perform ADLs, household chores, and recreation/fitness activities without falls and with no/less pain.      5 min Neuromuscular Re-ed: [x]  See flow sheet   Rationale:      increase strength, improve coordination, improve balance and increase proprioception to improve the patients ability to transfer, stand, walk, perform ADLs, household chores, and recreation/fitness activities without falls and with no/less pain.      8 min Gait Training: In parallel bars with min right UE use and visual feedback from mirror--6-7' x 10 laps; repeat without hand use for 5 laps. Discussed correct use of walker and staying immediately behind and between hand , not turning to quickly/sharply, and using walking until body is positioned fully in front of chair and prepared to sit down. Rationale: To improve ambulation safety and efficiency in order to improve patient's ability to safely ambulate at home for self care. Billed With/As:   [] TE   [] TA   [] Neuro   [] Self Care Patient Education: [x] Review HEP    [] Progressed/Changed HEP based on:   [] positioning   [] body mechanics   [] transfers   [] heat/ice application    [] other:        Other Objective/Functional Measures:    Improving gait in parallel bars and up and down stairs; Left hip ER and ABd, more so than IR and other motion directions, are still weak versus R LE. Post Treatment Pain Level (on 0 to 10) scale:   2 / 10--right proximal anterior leg. ASSESSMENT    Assessment/Changes in Function:     One episode of near LOB with turning quickly (to R) with walker in preparation for sitting while still far from chair. []  See Progress Note/Recertification   Patient will continue to benefit from skilled PT services to modify and progress therapeutic interventions, address functional mobility deficits, address ROM deficits, address strength deficits, analyze and address soft tissue restrictions, analyze and cue movement patterns, analyze and modify body mechanics/ergonomics, assess and modify postural abnormalities and instruct in home and community integration to attain remaining goals.       Progress toward goals / Updated goals:    Good Progress to    [] STG    [x] LTG  2, 3, and 4 as shown by observed motion/mobility and strength during PT activities this session. PLAN    [x]  Upgrade activities as tolerated YES Continue plan of care   []  Discharge due to :    []  Other:      Therapist: Joseluis Santillan PT    Date: 2022 Time: 7:15 AM     Future Appointments   Date Time Provider Jeremias Joyce   2022  9:00 AM Ever Mcintosh, PT [facility]   2022  9:15 AM Sandoval Crocker, PTA [facility]     PHYSICAL THERAPY - DAILY TREATMENT NOTE      Patient Name: Herberth Nj                           Date:   : 1926                                  YES Patient  Verified  Visit #:      12                  Insurance: Payor: Mirtha Caballero / Plan: [place] MEDICARE PART A & B / Product Type: Medicare /       In time: 8:53 AM Out time: 9:52 AM   Total Treatment Time: 59 min.            Medicare/BCBS Time Tracking (below)   Total Timed Codes (min):  59 min. 1:1 Treatment Time:  58 min.      TREATMENT AREA =  Other abnormalities of gait and mobility [R26.89]     SUBJECTIVE     Pain Level (on 0 to 10 scale):  0 / 10   Medication Changes/New allergies or changes in medical history, any new surgeries or procedures?    NO    If yes, update Summary List   Subjective Functional Status/Changes:  []?????  No changes reported      No c/o pain currently, but felt \"stiff\" after his last PT session and the next day--he applied ice.       OBJECTIVE     25 min Therapeutic Exercise:  [x]? ????  See flow sheet   Rationale:      increase ROM, increase strength, improve coordination, improve balance and increase proprioception to improve the patients ability to transfer, stand, walk, perform ADLs, household chores, and recreation/fitness activities without falls and with no/less pain.       15 min Therapeutic Activity: [x]?????  See flow sheet   Rationale:      increase ROM, increase strength, improve coordination, improve balance and increase proprioception to improve the patients ability to transfer, stand, walk, perform ADLs, household chores, and recreation/fitness activities without falls and with no/less pain.       19 min Neuromuscular Re-ed: [x]?????  See flow sheet   Rationale:      increase strength, improve coordination, improve balance and increase proprioception to improve the patients ability to transfer, stand, walk, perform ADLs, household chores, and recreation/fitness activities without falls and with no/less pain.       Billed With/As:   []????? TE   []????? TA   []????? Neuro   []????? Self Care Patient Education: [x]????? Review HEP    []????? Progressed/Changed HEP based on:   []????? positioning   []????? body mechanics   []????? transfers   []????? heat/ice application    []????? other:                 Other Objective/Functional Measures:     Pt did very well with walking in parallel bars and 360 degrees turns and sidestepping.    Post Treatment Pain Level (on 0 to 10) scale:   0 / 10      ASSESSMENT     Assessment/Changes in Function:      Right hip aBd and ER, more so than IR, remai weak, but improving.      []?????  See Progress Note/Recertification   Patient will continue to benefit from skilled PT services to modify and progress therapeutic interventions, address functional mobility deficits, address ROM deficits, address strength deficits, analyze and address soft tissue restrictions, analyze and cue movement patterns, analyze and modify body mechanics/ergonomics, assess and modify postural abnormalities and instruct in home and community integration to attain remaining goals.      Progress toward goals / Updated goals:     Good Progress to    [x]????? STG    []????? LTG  1, 2, 4 as shown by patient report and observed performance of activities during the PT session.         PLAN               [x]?????  Upgrade activities as tolerated YES Continue plan of care   []?????  Discharge due to :     []?????  Other:                  Therapist: Naveed Hewitt, MPT   Date: 01/26/2022 Time: 7:17 AM

## 2022-02-16 ENCOUNTER — APPOINTMENT (OUTPATIENT)
Dept: PHYSICAL THERAPY | Age: 87
End: 2022-02-16
Payer: MEDICARE

## 2022-02-17 ENCOUNTER — HOSPITAL ENCOUNTER (OUTPATIENT)
Dept: PHYSICAL THERAPY | Age: 87
Discharge: HOME | End: 2022-02-17
Payer: MEDICARE

## 2022-02-17 PROCEDURE — 97110 THERAPEUTIC EXERCISES: CPT

## 2022-02-17 NOTE — PROGRESS NOTES
PHYSICAL THERAPY - DAILY TREATMENT NOTE     Patient Name: Carlos A Lozada        Date: 2022  : 1926   YES Patient  Verified  Visit #:     Insurance: Payor: Marisol Kennedy / Plan: [place] MEDICARE PART A & B / Product Type: Medicare /      In time: 9: 21 Out time: 10:04   Total Treatment Time: 43     Medicare/BCBS Time Tracking (below)   Total Timed Codes (min):  43 1:1 Treatment Time:  43     TREATMENT AREA =  Other abnormalities of gait and mobility [R26.89]    SUBJECTIVE    Pain Level (on 0 to 10 scale):  0  / 10   Medication Changes/New allergies or changes in medical history, any new surgeries or procedures? NO    If yes, update Summary List   Subjective Functional Status/Changes:  []  No changes reported       Pt reports \"I think I'm ready to be done. \"         OBJECTIVE  Modalities Rationale:  N/a     39 min Therapeutic Exercise:  [x]  See flow sheet   Rationale:      increase ROM, increase strength, improve coordination, improve balance and increase proprioception to improve the patients ability to perform functional ADLs without fall risk . 4 min Gait Training:  _80 ft __ feet with __SPC_ device on level surfaces with _SBA __ level of assistance   Rationale: To improve ambulation safety and efficiency in order to improve patient's ability to safely ambulate at home for self care. Billed With/As:   [x] TE   [] TA   [] Neuro   [] Self Care Patient Education: [x] Review HEP, fall prevention in ADLs    [] Progressed/Changed HEP based on:   [] positioning   [] body mechanics   [] transfers   [] heat/ice application    [] other:        Other Objective/Functional Measures:     FOTO: 47  GROC: + 5      Post Treatment Pain Level (on 0 to 10) scale:  0 / 10     ASSESSMENT    Assessment/Changes in Function:   Review discharge instructions; fall prevention in home      [x]  See Progress Note/Recertification   Patient will continue to benefit from skilled PT services to modify and progress therapeutic interventions, address functional mobility deficits, address ROM deficits, address strength deficits, address imbalance/dizziness and instruct in home and community integration to attain remaining goals. Progress toward goals / Updated goals:  See discharge     PLAN    []  Upgrade activities as tolerated NO Continue plan of care   [x]  Discharge due to : Pt progressing toward goals/ pt request   []  Other:      Therapist: Nichole Mackey PTA    Date: 2/17/2022 Time: 10:04  AM   No future appointments.

## 2022-02-17 NOTE — PROGRESS NOTES
201 [facility] PHYSICAL THERAPY AT [address] [facility], [address] [place] [address] Way, [address], [address] [facility]  Phone: [phone]  Fax: [phone]  DISCHARGE SUMMARY FOR PHYSICAL THERAPY          Patient Name: Chante Lira : 1926   Treatment/Medical Diagnosis: Other abnormalities of gait and mobility [R26.89]   Onset Date: \"Aug 2021\"    Referral Source: Naveed Del Rosario MD Start of [facility]): 2022   Prior Hospitalization: See Medical History Provider #: [id number]   Prior Level of Function: Lives with his adult daughter and has an adult son living in the [facility]; retired from working, drives, lawn and yard chores on riding mower for his [employer]; independent with ADLs and most household chores--[employer] vacuums and son does his yard chores; home and community ambulation with [facility] after left [facility] until falling 2021. Comorbidities: Hypothyroidism; Depression; Arthritis; Hearing Impairment (hearing aids); Asbestosis; Right TKR (several years ago and doing well)   Medications: Verified on Patient Summary List   Visits from [facility]: 12 Missed Visits: 0       Goal/Measure of Progress Goal Met? 1. Increase FOTO score to >/= 62/100 indicating improved function. Status at last Eval: 54 Current Status: 54; pt reported on GROC +5 a good deal better no   2. Decrease max pain scale rating by >/= 5-6 points. Status at last Eval: 0 to 8/10 Current Status: 0/10 yes   3. No tenderness to palpation. Status at last Eval: Tenderness to palpation Current Status: No tenderness to palpation yes   4.  4. Increase L LE MMT scores by >/= 2/3 grade or to >/= 5- to 5/5 throughout and no/negligible discomfort for resistive testing. 5. Patient safe for home and community ambulation with right SPC, including stairs and curbs and inclines. Status at last Eval: Left LE MMT:   Left knee ext (sit) 5/5; right: 5/5;  Knee flexion: left:  5/5; right: 5-/5;   Hip flexion: (sit)      Left hip ABD in side lyin/5;  Left hip ER: 3+/5  Left hip IR:3+/5  Current Status: Sit to stand with minimal use of UE for support  Left LE MMT:   Left knee ext (sit) 5/5; right: 5/5;  Knee flexion: left:  5/5; right: 5-/5; Hip flexion: (sit)      Left hip ABD in side lyin/5;  Left hip ER: 3+/5  Left hip IR:3+/5  Partially met     Key Functional Changes/Progress: Patient reports intermittent use of SPC in the house and walking to the car. Pt reports increased stability and confidence in use of SPC since beginning therapy. Pt reporting 1 fall since beginning physical therapy. Patient has been instructed in HEP. Emphasized consistent performance of HEP for continued progress toward all goals. Pt education in fall prevention for functional ADLs   Pt reporting on GROC +5 - A good deal better. Assessments/Recommendations: Discontinue therapy. Progressing towards or have reached established goals. Patient request  If you have any questions/comments please contact us directly at [phone]. Thank you for allowing us to assist in the care of your patient.     Therapist Signature: Sam Marino PTA Date: 2022     Reporting Period: 2022 to 2022 Time: 9:31 AM

## 2022-02-19 ENCOUNTER — APPOINTMENT (OUTPATIENT)
Dept: PHYSICAL THERAPY | Age: 87
End: 2022-02-19
Payer: MEDICARE